# Patient Record
Sex: FEMALE | Race: WHITE | URBAN - METROPOLITAN AREA
[De-identification: names, ages, dates, MRNs, and addresses within clinical notes are randomized per-mention and may not be internally consistent; named-entity substitution may affect disease eponyms.]

---

## 2024-04-15 ENCOUNTER — TELEPHONE (OUTPATIENT)
Dept: PULMONOLOGY | Age: 44
End: 2024-04-15

## 2024-04-15 DIAGNOSIS — R05.9 COUGH, UNSPECIFIED TYPE: Primary | ICD-10-CM

## 2024-04-15 NOTE — TELEPHONE ENCOUNTER
Taya will be new patient, appt on 05/14/2024, for daily cough productive of \"thick, nasty\" sputum.  Had methacholine challenge at [facility] in 10/2016.   Are PFT and CXR needed prior to appt?  If appropriate, please sign pended orders.

## 2024-04-16 NOTE — TELEPHONE ENCOUNTER
Taya was called with Dr. Butler's comment.  She will get CXR, probably same day just prior to appt at [address].

## 2024-05-02 ENCOUNTER — TELEPHONE (OUTPATIENT)
Dept: PULMONOLOGY | Age: 44
End: 2024-05-02

## 2024-05-02 NOTE — TELEPHONE ENCOUNTER
Patient requested order for CXR be faxed to [facility].  Order was faxed to [phone].   Taya will request CD of images.

## 2024-05-14 ENCOUNTER — OFFICE VISIT (OUTPATIENT)
Dept: PULMONOLOGY | Age: 44
End: 2024-05-14
Payer: COMMERCIAL

## 2024-05-14 VITALS
OXYGEN SATURATION: 95 % | WEIGHT: 172 LBS | SYSTOLIC BLOOD PRESSURE: 120 MMHG | BODY MASS INDEX: 26.07 KG/M2 | HEART RATE: 75 BPM | HEIGHT: 68 IN | DIASTOLIC BLOOD PRESSURE: 70 MMHG

## 2024-05-14 DIAGNOSIS — R05.3 CHRONIC COUGH: Primary | ICD-10-CM

## 2024-05-14 PROCEDURE — 99204 OFFICE O/P NEW MOD 45 MIN: CPT | Performed by: INTERNAL MEDICINE

## 2024-05-14 RX ORDER — BENZONATATE 100 MG/1
100 CAPSULE ORAL 3 TIMES DAILY PRN
COMMUNITY

## 2024-05-14 RX ORDER — FLUTICASONE PROPIONATE 50 MCG
1 SPRAY, SUSPENSION (ML) NASAL DAILY
Qty: 16 G | Refills: 1 | Status: SHIPPED | OUTPATIENT
Start: 2024-05-14

## 2024-05-14 RX ORDER — CETIRIZINE HYDROCHLORIDE 10 MG/1
10 TABLET ORAL DAILY
COMMUNITY
End: 2024-05-14

## 2024-05-14 RX ORDER — LORATADINE 10 MG/1
10 TABLET ORAL DAILY
Qty: 30 TABLET | Refills: 1 | Status: SHIPPED | OUTPATIENT
Start: 2024-05-14

## 2024-05-14 RX ORDER — BUDESONIDE AND FORMOTEROL FUMARATE DIHYDRATE 160; 4.5 UG/1; UG/1
2 AEROSOL RESPIRATORY (INHALATION) 2 TIMES DAILY
Qty: 10.2 G | Refills: 1 | Status: SHIPPED | OUTPATIENT
Start: 2024-05-14

## 2024-05-14 RX ORDER — SODIUM PHOSPHATE,MONO-DIBASIC 19G-7G/118
1000 ENEMA (ML) RECTAL EVERY OTHER DAY
COMMUNITY

## 2024-05-14 ASSESSMENT — ENCOUNTER SYMPTOMS
CHEST TIGHTNESS: 0
SHORTNESS OF BREATH: 0
COUGH: 1
WHEEZING: 0

## 2024-05-14 NOTE — PROGRESS NOTES
tightness, shortness of breath and wheezing.    Cardiovascular:  Negative for chest pain, palpitations and leg swelling.   Neurological:  Negative for weakness.   Psychiatric/Behavioral:  The patient is not nervous/anxious.    All other systems reviewed and are negative.        Objective:   PHYSICAL EXAM:        VITALS:  /70 (Site: Left Upper Arm, Position: Sitting, Cuff Size: Medium Adult)   Pulse 75   Ht 1.727 m (5' 8\")   Wt 78 kg (172 lb)   SpO2 95%   BMI 26.15 kg/m²     Physical Exam  Vitals reviewed.   Constitutional:       Appearance: She is well-developed.   HENT:      Head: Normocephalic and atraumatic.   Eyes:      Pupils: Pupils are equal, round, and reactive to light.   Neck:      Vascular: No JVD.   Cardiovascular:      Rate and Rhythm: Normal rate and regular rhythm.      Heart sounds: No murmur heard.     No friction rub. No gallop.   Pulmonary:      Effort: Pulmonary effort is normal. No respiratory distress.      Breath sounds: No stridor. Rhonchi (scattered rhonchi) present. No rales.   Abdominal:      General: Bowel sounds are normal.      Palpations: Abdomen is soft.   Musculoskeletal:         General: No deformity.      Cervical back: Neck supple.   Skin:     General: Skin is warm and dry.   Neurological:      Mental Status: She is alert and oriented to person, place, and time.   Psychiatric:         Behavior: Behavior normal.         DATA:    PA AND LATERAL CHEST X-RAY,  5/2/2024 11:27 AM     CLINICAL HISTORY:  R05.9-Cough, unspecified-ICD-10-CM     COMPARISON:  None.     PROCEDURE COMMENTS: Frontal and lateral views of the chest.     FINDINGS: Cardiovascular structures within normal limits.  No pneumonia or   effusion.  No pneumothorax.     Assessment:      Diagnosis Orders   1. Chronic cough            Plan:   43 year old female is here for evaluation of chronic cough that is going on for the past 8 years.  Apparently it started after some sort of upper respiratory tract infection.

## 2024-06-11 RX ORDER — LORATADINE 10 MG/1
10 TABLET ORAL DAILY
Qty: 90 TABLET | Refills: 2 | Status: SHIPPED | OUTPATIENT
Start: 2024-06-11

## 2024-06-21 ENCOUNTER — OFFICE VISIT (OUTPATIENT)
Dept: PULMONOLOGY | Age: 44
End: 2024-06-21
Payer: COMMERCIAL

## 2024-06-21 VITALS
HEART RATE: 73 BPM | OXYGEN SATURATION: 99 % | WEIGHT: 169 LBS | DIASTOLIC BLOOD PRESSURE: 62 MMHG | SYSTOLIC BLOOD PRESSURE: 100 MMHG | HEIGHT: 68 IN | BODY MASS INDEX: 25.61 KG/M2

## 2024-06-21 DIAGNOSIS — J45.991 COUGH VARIANT ASTHMA: Primary | ICD-10-CM

## 2024-06-21 PROCEDURE — 99214 OFFICE O/P EST MOD 30 MIN: CPT | Performed by: INTERNAL MEDICINE

## 2024-06-21 RX ORDER — BUDESONIDE AND FORMOTEROL FUMARATE DIHYDRATE 80; 4.5 UG/1; UG/1
2 AEROSOL RESPIRATORY (INHALATION) 2 TIMES DAILY
Qty: 10.2 G | Refills: 5 | Status: SHIPPED | OUTPATIENT
Start: 2024-07-28

## 2024-06-21 RX ORDER — LORATADINE 10 MG/1
10 TABLET ORAL DAILY
Qty: 90 TABLET | Refills: 3 | Status: SHIPPED | OUTPATIENT
Start: 2024-06-21

## 2024-06-21 ASSESSMENT — ENCOUNTER SYMPTOMS
SHORTNESS OF BREATH: 0
COUGH: 1
WHEEZING: 0
CHEST TIGHTNESS: 0

## 2024-06-21 NOTE — PROGRESS NOTES
limits.  No pneumonia or   effusion.  No pneumothorax.     Assessment:      Diagnosis Orders   1. Cough variant asthma            Plan:   43 year old female was seen 1st for chronic cough and was found to have cough variant asthma with allergic rhinitis and post nasal drip.  that is going on for the past 8 years.  Apparently it started after some sort of upper respiratory tract infection.    It is on and off during the day but mainly in the mornings and worse during spring.     She also has sinus congestion and post nasal drip that appears to be allergic.  She was taking zyrtec regularly and has rhonchi on exam.    Non smoker  CXR is normal.    Prior PFT reported negative.  I don't have the results.     She was started on symbicort, flonase, and claritin on 5/15/24  She had significant improvement.  Cough is almost resolved.      Plan   Continue symbicort 160 for one more month then drop it to 80/4.5.  after 1-2 months stop flonase.    During this time strategies of inhaler management discussed, including how to step up or down on treatment.    F/u in 6 months to reassess.

## 2024-07-06 DIAGNOSIS — R06.02 SOB (SHORTNESS OF BREATH): Primary | ICD-10-CM

## 2024-07-09 RX ORDER — BUDESONIDE AND FORMOTEROL FUMARATE DIHYDRATE 160; 4.5 UG/1; UG/1
2 AEROSOL RESPIRATORY (INHALATION) 2 TIMES DAILY
Qty: 30.6 G | Refills: 5 | Status: SHIPPED | OUTPATIENT
Start: 2024-07-09

## 2024-11-08 ENCOUNTER — OFFICE VISIT (OUTPATIENT)
Dept: PULMONOLOGY | Age: 44
End: 2024-11-08
Payer: COMMERCIAL

## 2024-11-08 VITALS
OXYGEN SATURATION: 94 % | RESPIRATION RATE: 16 BRPM | BODY MASS INDEX: 25.61 KG/M2 | HEART RATE: 86 BPM | HEIGHT: 68 IN | DIASTOLIC BLOOD PRESSURE: 60 MMHG | SYSTOLIC BLOOD PRESSURE: 110 MMHG | WEIGHT: 169 LBS

## 2024-11-08 DIAGNOSIS — J45.41 MODERATE PERSISTENT ASTHMA WITH ACUTE EXACERBATION: Primary | ICD-10-CM

## 2024-11-08 DIAGNOSIS — R06.02 SOB (SHORTNESS OF BREATH): ICD-10-CM

## 2024-11-08 PROCEDURE — 99214 OFFICE O/P EST MOD 30 MIN: CPT | Performed by: INTERNAL MEDICINE

## 2024-11-08 RX ORDER — FLUTICASONE PROPIONATE 50 MCG
1 SPRAY, SUSPENSION (ML) NASAL DAILY
Qty: 16 G | Refills: 2 | Status: SHIPPED | OUTPATIENT
Start: 2024-11-08

## 2024-11-08 RX ORDER — PREDNISONE 20 MG/1
40 TABLET ORAL DAILY
Qty: 10 TABLET | Refills: 0 | Status: SHIPPED | OUTPATIENT
Start: 2024-11-08

## 2024-11-08 RX ORDER — BUDESONIDE AND FORMOTEROL FUMARATE DIHYDRATE 160; 4.5 UG/1; UG/1
2 AEROSOL RESPIRATORY (INHALATION) 2 TIMES DAILY
Qty: 10.2 G | Refills: 5 | Status: SHIPPED | OUTPATIENT
Start: 2024-11-08

## 2024-11-08 NOTE — PROGRESS NOTES
Cervical back: Neck supple.   Skin:     General: Skin is warm and dry.   Neurological:      Mental Status: She is alert and oriented to person, place, and time.   Psychiatric:         Behavior: Behavior normal.         DATA:    PA AND LATERAL CHEST X-RAY,  5/2/2024 11:27 AM     CLINICAL HISTORY:  R05.9-Cough, unspecified-ICD-10-CM     COMPARISON:  None.     PROCEDURE COMMENTS: Frontal and lateral views of the chest.     FINDINGS: Cardiovascular structures within normal limits.  No pneumonia or   effusion.  No pneumothorax.     Assessment:      Diagnosis Orders   1. Moderate persistent asthma with acute exacerbation        2. SOB (shortness of breath)  budesonide-formoterol (SYMBICORT) 160-4.5 MCG/ACT AERO          Plan:   44 year old female was seen 1st in May 2024 for chronic cough and was found to have cough variant asthma with allergic rhinitis and post nasal drip.  that is going on for the past 8 years.  Apparently it started after some sort of upper respiratory tract infection.    It is on and off during the day but mainly in the mornings and worse during spring.     She also has sinus congestion and post nasal drip that appears to be allergic.  She was taking zyrtec regularly and has rhonchi on exam.    Non smoker  CXR is normal.    Prior PFT reported negative.  I don't have the results.     She was started on symbicort, flonase, and claritin on 5/15/24  She had significant improvement.  Cough is almost resolved.      She stepped down on treatment to symbicort 80 and discontinued flonase.  She was doing well for few months but had another episode of exacerbation two weeks ago.      Plan   Will increase dose of symbicort to 160  Restart flonase   Short course prednisone burst   Asthma action plan discussed.

## 2024-11-11 ASSESSMENT — ENCOUNTER SYMPTOMS: WHEEZING: 1

## 2025-05-28 RX ORDER — BUDESONIDE AND FORMOTEROL FUMARATE DIHYDRATE 160; 4.5 UG/1; UG/1
2 AEROSOL RESPIRATORY (INHALATION) 2 TIMES DAILY
Qty: 10.2 G | Refills: 3 | Status: SHIPPED | OUTPATIENT
Start: 2025-05-28

## 2025-07-08 RX ORDER — BUDESONIDE AND FORMOTEROL FUMARATE DIHYDRATE 160; 4.5 UG/1; UG/1
2 AEROSOL RESPIRATORY (INHALATION) 2 TIMES DAILY
Qty: 10.2 G | Refills: 3 | Status: SHIPPED | OUTPATIENT
Start: 2025-07-08

## 2025-07-08 RX ORDER — LORATADINE 10 MG/1
10 TABLET ORAL DAILY
Qty: 90 TABLET | Refills: 3 | Status: SHIPPED | OUTPATIENT
Start: 2025-07-08 | End: 2025-07-08 | Stop reason: SDUPTHER

## 2025-07-08 RX ORDER — FLUTICASONE PROPIONATE 50 MCG
1 SPRAY, SUSPENSION (ML) NASAL DAILY
Qty: 16 G | Refills: 2 | Status: SHIPPED | OUTPATIENT
Start: 2025-07-08

## 2025-07-08 RX ORDER — LORATADINE 10 MG/1
10 TABLET ORAL DAILY
Qty: 90 TABLET | Refills: 3 | Status: SHIPPED | OUTPATIENT
Start: 2025-07-08

## 2025-07-08 NOTE — TELEPHONE ENCOUNTER
Taya called looking to have medications sent to new pharmacy      Requested Prescriptions     Pending Prescriptions Disp Refills    loratadine (CLARITIN) 10 MG tablet 90 tablet 3     Sig: Take 1 tablet by mouth daily    budesonide-formoterol (SYMBICORT) 160-4.5 MCG/ACT AERO 10.2 g 3     Sig: Inhale 2 puffs into the lungs 2 times daily    fluticasone (FLONASE) 50 MCG/ACT nasal spray 16 g 2     Si spray by Each Nostril route daily      Wants it sent to [facility]